# Patient Record
Sex: FEMALE | Race: WHITE | NOT HISPANIC OR LATINO | Employment: FULL TIME | URBAN - METROPOLITAN AREA
[De-identification: names, ages, dates, MRNs, and addresses within clinical notes are randomized per-mention and may not be internally consistent; named-entity substitution may affect disease eponyms.]

---

## 2020-07-20 PROBLEM — J45.20 MILD INTERMITTENT ASTHMA WITHOUT COMPLICATION: Status: ACTIVE | Noted: 2020-07-20

## 2020-07-20 PROBLEM — O09.899 RUBELLA NON-IMMUNE STATUS, ANTEPARTUM: Status: ACTIVE | Noted: 2020-07-20

## 2020-07-20 PROBLEM — O09.92 SUPERVISION OF HIGH RISK PREGNANCY IN SECOND TRIMESTER: Status: ACTIVE | Noted: 2020-07-20

## 2020-07-20 PROBLEM — Z28.39 RUBELLA NON-IMMUNE STATUS, ANTEPARTUM: Status: ACTIVE | Noted: 2020-07-20

## 2020-07-20 PROBLEM — O28.0 ABNORMAL MSAFP (MATERNAL SERUM ALPHA-FETOPROTEIN), ELEVATED: Status: ACTIVE | Noted: 2020-07-20

## 2020-10-05 PROBLEM — O09.93 SUPERVISION OF HIGH RISK PREGNANCY IN THIRD TRIMESTER: Status: ACTIVE | Noted: 2020-07-20

## 2020-10-29 PROBLEM — Z3A.35 35 WEEKS GESTATION OF PREGNANCY: Status: ACTIVE | Noted: 2020-10-29

## 2020-10-29 PROBLEM — O99.810 ABNORMAL O'SULLIVAN GLUCOSE CHALLENGE TEST, ANTEPARTUM: Status: ACTIVE | Noted: 2020-10-29

## 2020-10-29 PROBLEM — O46.93 VAGINAL BLEEDING IN PREGNANCY, THIRD TRIMESTER: Status: ACTIVE | Noted: 2020-10-29

## 2020-11-02 PROBLEM — Z3A.36 36 WEEKS GESTATION OF PREGNANCY: Status: ACTIVE | Noted: 2020-10-29

## 2020-11-09 ENCOUNTER — SOCIAL WORK (OUTPATIENT)
Dept: ADMINISTRATIVE | Facility: OTHER | Age: 20
End: 2020-11-09

## 2020-11-09 PROBLEM — Z3A.37 37 WEEKS GESTATION OF PREGNANCY: Status: ACTIVE | Noted: 2020-10-29

## 2020-11-09 NOTE — PROGRESS NOTES
SW received pt's FMLA paperwork from the pt. SW completed the demographic sheet/attached clinical note on pt's FMLA paperwork and gave the paperwork to MD for review/signature. SW later received pt's completed paperwork from MD and fax to([phone]). SW notify pt([phone]). SW scanned paperwork into epic. No other needs identified at this time.    Pooja Farr,MSW  Pager#[phone]

## 2020-11-14 PROBLEM — Z3A.38 38 WEEKS GESTATION OF PREGNANCY: Status: ACTIVE | Noted: 2020-10-29

## 2020-11-14 PROBLEM — O42.90 PREMATURE RUPTURE OF MEMBRANES: Status: ACTIVE | Noted: 2020-11-14

## 2020-12-28 PROBLEM — O42.90 PREMATURE RUPTURE OF MEMBRANES: Status: RESOLVED | Noted: 2020-11-14 | Resolved: 2020-12-28

## 2020-12-28 PROBLEM — O28.0 ABNORMAL MSAFP (MATERNAL SERUM ALPHA-FETOPROTEIN), ELEVATED: Status: RESOLVED | Noted: 2020-07-20 | Resolved: 2020-12-28

## 2020-12-28 PROBLEM — O46.93 VAGINAL BLEEDING IN PREGNANCY, THIRD TRIMESTER: Status: RESOLVED | Noted: 2020-10-29 | Resolved: 2020-12-28

## 2020-12-28 PROBLEM — O09.899 RUBELLA NON-IMMUNE STATUS, ANTEPARTUM: Status: RESOLVED | Noted: 2020-07-20 | Resolved: 2020-12-28

## 2020-12-28 PROBLEM — O09.93 SUPERVISION OF HIGH RISK PREGNANCY IN THIRD TRIMESTER: Status: RESOLVED | Noted: 2020-07-20 | Resolved: 2020-12-28

## 2020-12-28 PROBLEM — Z3A.38 38 WEEKS GESTATION OF PREGNANCY: Status: RESOLVED | Noted: 2020-10-29 | Resolved: 2020-12-28

## 2020-12-28 PROBLEM — Z28.39 RUBELLA NON-IMMUNE STATUS, ANTEPARTUM: Status: RESOLVED | Noted: 2020-07-20 | Resolved: 2020-12-28

## 2020-12-28 PROBLEM — O99.810 ABNORMAL O'SULLIVAN GLUCOSE CHALLENGE TEST, ANTEPARTUM: Status: RESOLVED | Noted: 2020-10-29 | Resolved: 2020-12-28
